# Patient Record
Sex: MALE | Race: WHITE | NOT HISPANIC OR LATINO | Employment: FULL TIME | URBAN - METROPOLITAN AREA
[De-identification: names, ages, dates, MRNs, and addresses within clinical notes are randomized per-mention and may not be internally consistent; named-entity substitution may affect disease eponyms.]

---

## 2023-05-24 ENCOUNTER — LAB (OUTPATIENT)
Dept: LAB | Facility: LAB | Age: 28
End: 2023-05-24
Payer: COMMERCIAL

## 2023-05-24 ENCOUNTER — OFFICE VISIT (OUTPATIENT)
Dept: PRIMARY CARE | Facility: CLINIC | Age: 28
End: 2023-05-24
Payer: COMMERCIAL

## 2023-05-24 VITALS
SYSTOLIC BLOOD PRESSURE: 120 MMHG | HEIGHT: 71 IN | WEIGHT: 163.4 LBS | DIASTOLIC BLOOD PRESSURE: 78 MMHG | BODY MASS INDEX: 22.88 KG/M2

## 2023-05-24 DIAGNOSIS — Z00.00 ROUTINE GENERAL MEDICAL EXAMINATION AT A HEALTH CARE FACILITY: ICD-10-CM

## 2023-05-24 DIAGNOSIS — Z00.00 ROUTINE GENERAL MEDICAL EXAMINATION AT A HEALTH CARE FACILITY: Primary | ICD-10-CM

## 2023-05-24 LAB
ALANINE AMINOTRANSFERASE (SGPT) (U/L) IN SER/PLAS: 23 U/L (ref 10–52)
ALBUMIN (G/DL) IN SER/PLAS: 4.7 G/DL (ref 3.4–5)
ALKALINE PHOSPHATASE (U/L) IN SER/PLAS: 44 U/L (ref 33–120)
ANION GAP IN SER/PLAS: 8 MMOL/L (ref 10–20)
ASPARTATE AMINOTRANSFERASE (SGOT) (U/L) IN SER/PLAS: 25 U/L (ref 9–39)
BILIRUBIN TOTAL (MG/DL) IN SER/PLAS: 1 MG/DL (ref 0–1.2)
CALCIUM (MG/DL) IN SER/PLAS: 10.1 MG/DL (ref 8.6–10.3)
CARBON DIOXIDE, TOTAL (MMOL/L) IN SER/PLAS: 31 MMOL/L (ref 21–32)
CHLORIDE (MMOL/L) IN SER/PLAS: 102 MMOL/L (ref 98–107)
CHOLESTEROL (MG/DL) IN SER/PLAS: 181 MG/DL (ref 0–199)
CHOLESTEROL IN HDL (MG/DL) IN SER/PLAS: 54 MG/DL
CHOLESTEROL/HDL RATIO: 3.4
CREATININE (MG/DL) IN SER/PLAS: 1.07 MG/DL (ref 0.5–1.3)
ERYTHROCYTE DISTRIBUTION WIDTH (RATIO) BY AUTOMATED COUNT: 12.5 % (ref 11.5–14.5)
ERYTHROCYTE MEAN CORPUSCULAR HEMOGLOBIN CONCENTRATION (G/DL) BY AUTOMATED: 32.9 G/DL (ref 32–36)
ERYTHROCYTE MEAN CORPUSCULAR VOLUME (FL) BY AUTOMATED COUNT: 88 FL (ref 80–100)
ERYTHROCYTES (10*6/UL) IN BLOOD BY AUTOMATED COUNT: 5.03 X10E12/L (ref 4.5–5.9)
ESTIMATED AVERAGE GLUCOSE FOR HBA1C: 103 MG/DL
GFR MALE: >90 ML/MIN/1.73M2
GLUCOSE (MG/DL) IN SER/PLAS: 78 MG/DL (ref 74–99)
HEMATOCRIT (%) IN BLOOD BY AUTOMATED COUNT: 44.1 % (ref 41–52)
HEMOGLOBIN (G/DL) IN BLOOD: 14.5 G/DL (ref 13.5–17.5)
HEMOGLOBIN A1C/HEMOGLOBIN TOTAL IN BLOOD: 5.2 %
LDL: 115 MG/DL (ref 0–99)
LEUKOCYTES (10*3/UL) IN BLOOD BY AUTOMATED COUNT: 5.8 X10E9/L (ref 4.4–11.3)
PLATELETS (10*3/UL) IN BLOOD AUTOMATED COUNT: 237 X10E9/L (ref 150–450)
POTASSIUM (MMOL/L) IN SER/PLAS: 4.4 MMOL/L (ref 3.5–5.3)
PROTEIN TOTAL: 7.1 G/DL (ref 6.4–8.2)
SODIUM (MMOL/L) IN SER/PLAS: 137 MMOL/L (ref 136–145)
TRIGLYCERIDE (MG/DL) IN SER/PLAS: 58 MG/DL (ref 0–149)
UREA NITROGEN (MG/DL) IN SER/PLAS: 21 MG/DL (ref 6–23)
VLDL: 12 MG/DL (ref 0–40)

## 2023-05-24 PROCEDURE — 85027 COMPLETE CBC AUTOMATED: CPT

## 2023-05-24 PROCEDURE — 83036 HEMOGLOBIN GLYCOSYLATED A1C: CPT

## 2023-05-24 PROCEDURE — 80053 COMPREHEN METABOLIC PANEL: CPT

## 2023-05-24 PROCEDURE — 36415 COLL VENOUS BLD VENIPUNCTURE: CPT

## 2023-05-24 PROCEDURE — 1036F TOBACCO NON-USER: CPT | Performed by: INTERNAL MEDICINE

## 2023-05-24 PROCEDURE — 99395 PREV VISIT EST AGE 18-39: CPT | Performed by: INTERNAL MEDICINE

## 2023-05-24 PROCEDURE — 80061 LIPID PANEL: CPT

## 2023-05-24 ASSESSMENT — PATIENT HEALTH QUESTIONNAIRE - PHQ9
1. LITTLE INTEREST OR PLEASURE IN DOING THINGS: NOT AT ALL
SUM OF ALL RESPONSES TO PHQ9 QUESTIONS 1 AND 2: 0
2. FEELING DOWN, DEPRESSED OR HOPELESS: NOT AT ALL

## 2023-05-24 NOTE — PROGRESS NOTES
"SUBJECTIVE:   Tobi Malone is a 27 y.o. male presenting for his annual checkup.  No current outpatient medications on file.     No current facility-administered medications for this visit.     Allergies: Amoxicillin     ROS:  Feeling well. No dyspnea or chest pain on exertion. No abdominal pain, change in bowel habits, black or bloody stools. No urinary tract or prostatic symptoms. No neurological complaints.    OBJECTIVE:   The patient appears well, alert, oriented x 3, in no distress.   /78   Ht 1.797 m (5' 10.75\")   Wt 74.1 kg (163 lb 6.4 oz)   BMI 22.95 kg/m²   ENT normal.  Neck supple. No adenopathy or thyromegaly. JESSICA. Lungs are clear, good air entry, no wheezes, rhonchi or rales. S1 and S2 normal, no murmurs, regular rate and rhythm. Abdomen is soft without tenderness, guarding, mass or organomegaly.  exam: no penile lesions or discharge, no testicular masses or tenderness, no hernias.  Extremities show no edema, normal peripheral pulses. Neurological is normal without focal findings.    ASSESSMENT:   healthy adult male    PLAN:   continue current healthy lifestyle patterns and return for routine annual checkups   #1 HSV- reviewed at length. Discussed natural history and wrist. Discussed risk of spread. Reviewed when necessary treatment for flare ups. Answered multiple questions.  #2 Hyper bilirubinemia- recheck  #3 elevated LDL cholesterol- reviewed. Diet and exercise discussed. Retest  "

## 2023-07-06 ENCOUNTER — TELEMEDICINE (OUTPATIENT)
Dept: PRIMARY CARE | Facility: CLINIC | Age: 28
End: 2023-07-06
Payer: COMMERCIAL

## 2023-07-06 DIAGNOSIS — J01.00 ACUTE NON-RECURRENT MAXILLARY SINUSITIS: Primary | ICD-10-CM

## 2023-07-06 PROCEDURE — 99213 OFFICE O/P EST LOW 20 MIN: CPT | Performed by: FAMILY MEDICINE

## 2023-07-06 RX ORDER — FLUTICASONE PROPIONATE 50 MCG
1 SPRAY, SUSPENSION (ML) NASAL DAILY
Qty: 16 G | Refills: 0 | Status: SHIPPED | OUTPATIENT
Start: 2023-07-06 | End: 2024-07-05

## 2023-07-06 RX ORDER — DOXYCYCLINE 100 MG/1
100 CAPSULE ORAL 2 TIMES DAILY
Qty: 14 CAPSULE | Refills: 0 | Status: SHIPPED | OUTPATIENT
Start: 2023-07-06 | End: 2023-07-13

## 2023-07-06 ASSESSMENT — ENCOUNTER SYMPTOMS
HOARSE VOICE: 1
CHILLS: 0
DIAPHORESIS: 0
SINUS PRESSURE: 1
SHORTNESS OF BREATH: 0
COUGH: 0
HEADACHES: 1
NECK PAIN: 1
SWOLLEN GLANDS: 0
SORE THROAT: 1

## 2023-07-06 ASSESSMENT — LIFESTYLE VARIABLES: HISTORY_OF_SMOKING: I HAVE NEVER SMOKED

## 2023-07-06 NOTE — PROGRESS NOTES
Subjective   Patient ID: Tobi Malone is a 27 y.o. male who presents for Sinusitis.    Sinusitis  This is a new problem. The current episode started in the past 7 days. The problem has been gradually worsening since onset. There has been no fever. His pain is at a severity of 7/10. The pain is moderate. Associated symptoms include congestion, ear pain, headaches, a hoarse voice, neck pain, sinus pressure and a sore throat. Pertinent negatives include no chills, coughing, diaphoresis, shortness of breath or swollen glands. Past treatments include oral decongestants and nasal decongestants. The treatment provided mild relief.        Review of Systems:  Constitutional: No fever or chills  Cardiovascular: no chest pain, no palpitations and no syncope.   Respiratory: no cough, no shortness of breath during exertion and no shortness of breath at rest.   Gastrointestinal: no abdominal pain, no nausea and no vomiting.  Neuro: No Headache, no dizziness    Physical Exam:   Constitutional: Alert and in no acute distress. Well developed, well nourished.   Head and Face: Head and face: Normal.     Eyes: Normal external exam.    Ears, Nose, Mouth, and Throat: External inspection of ears and nose: Normal.  Hearing: Normal.   Neck: No neck mass was observed. Supple.  Pulmonary: No respiratory distress.    Musculoskeletal: Range of motion: Normal.     Skin: Normal skin color and pigmentation, normal skin turgor, and no rash.    Neurologic: Coordination: Normal.    Psychiatric: Judgment and insight: Intact. Mood and affect: Normal.    Lab Results   Component Value Date    WBC 5.8 05/24/2023    HGB 14.5 05/24/2023    HCT 44.1 05/24/2023     05/24/2023    CHOL 181 05/24/2023    TRIG 58 05/24/2023    HDL 54.0 05/24/2023    ALT 23 05/24/2023    AST 25 05/24/2023     05/24/2023    K 4.4 05/24/2023     05/24/2023    CREATININE 1.07 05/24/2023    BUN 21 05/24/2023    CO2 31 05/24/2023    TSH 2.48 05/13/2021    HGBA1C 5.2  05/24/2023       No image results found.      Assessment/Plan   Diagnoses and all orders for this visit:  Acute non-recurrent maxillary sinusitis  -     doxycycline (Vibramycin) 100 mg capsule; Take 1 capsule (100 mg) by mouth 2 times a day for 7 days. Take with at least 8 ounces (large glass) of water, do not lie down for 30 minutes after  -     fluticasone (Flonase) 50 mcg/actuation nasal spray; Administer 1 spray into each nostril once daily. Shake gently. Before first use, prime pump. After use, clean tip and replace cap.        This Medical recommendation has been made based on the Telephonic/Video conversation and the history is given by the patient, which I as a Physician and the patient also understand that there are limitations given the lack of an in-person Physical Exam. Patient will call back if symptoms don't improve.      Lyudmila Nieto MD

## 2023-09-01 DIAGNOSIS — L50.9 HIVES: ICD-10-CM

## 2023-09-01 DIAGNOSIS — T63.444A BEE STING REACTION, UNDETERMINED INTENT, INITIAL ENCOUNTER: ICD-10-CM

## 2023-09-28 DIAGNOSIS — R35.0 URINARY FREQUENCY: ICD-10-CM

## 2023-09-28 DIAGNOSIS — R32 URINARY INCONTINENCE, UNSPECIFIED TYPE: ICD-10-CM

## 2023-10-05 ENCOUNTER — TELEPHONE (OUTPATIENT)
Dept: PRIMARY CARE | Facility: CLINIC | Age: 28
End: 2023-10-05
Payer: COMMERCIAL

## 2023-11-17 ENCOUNTER — CONSULT (OUTPATIENT)
Dept: ALLERGY | Facility: CLINIC | Age: 28
End: 2023-11-17
Payer: COMMERCIAL

## 2023-11-17 VITALS
WEIGHT: 164.19 LBS | SYSTOLIC BLOOD PRESSURE: 119 MMHG | HEIGHT: 72 IN | BODY MASS INDEX: 22.24 KG/M2 | DIASTOLIC BLOOD PRESSURE: 78 MMHG | HEART RATE: 54 BPM

## 2023-11-17 DIAGNOSIS — L50.0 ALLERGIC URTICARIA: ICD-10-CM

## 2023-11-17 DIAGNOSIS — T78.2XXA ANAPHYLAXIS, INITIAL ENCOUNTER: Primary | ICD-10-CM

## 2023-11-17 DIAGNOSIS — T63.91XA TOXIC EFFECT OF VENOM, ACCIDENTAL OR UNINTENTIONAL, INITIAL ENCOUNTER: ICD-10-CM

## 2023-11-17 PROCEDURE — 99204 OFFICE O/P NEW MOD 45 MIN: CPT | Performed by: ALLERGY & IMMUNOLOGY

## 2023-11-17 PROCEDURE — 95017 ALL TSTG PERQ&IQ W/VENOMS: CPT | Performed by: ALLERGY & IMMUNOLOGY

## 2023-11-17 RX ORDER — EPINEPHRINE 0.3 MG/.3ML
0.3 INJECTION SUBCUTANEOUS ONCE AS NEEDED
COMMUNITY
Start: 2023-08-27 | End: 2024-08-27

## 2023-11-17 ASSESSMENT — ENCOUNTER SYMPTOMS: ALLERGIC REACTION: 1

## 2023-11-17 ASSESSMENT — PAIN SCALES - GENERAL: PAINLEVEL: 0-NO PAIN

## 2023-11-17 NOTE — LETTER
Ketan,     Allow me to introduce this patient, Tobi Malone.     He had dizziness consistent with low blood pressure and severe urticaria after being stung by a suspected yellowjacket this Summer. He is positive on skin testing. Since he lives closer to your office, I'm sending him to you to discuss allergy immunotherapy.     He's bringing you a copy of the skin testing and my notes from [facility] should hopefully be attached to this letter.    Hope you're well and let me know if you have any questions.    Best regards, Jhoan

## 2023-11-17 NOTE — PROGRESS NOTES
Subjective   Patient ID:   [id number]   Tobi Malone is a 28 y.o. male who presents for Allergic Reaction (NPV-BEE STING REACTION).    Chief Complaint   Patient presents with    Allergic Reaction     NPV-BEE STING REACTION          Allergic Reaction      This patient is here to evaluate for:  Venom allergic reaction.    In 2023- stung on right forearm - while golfing. Unsure what type of stinging insect.   Large local. Resolved without incident    A week later, stung in ankle while in his yard.  [place] lightheaded. And itchy within 10 minutes locally.   Went to grocery store to purchase benadryl bc pruritic.  Severe itching and hives noticed 1-1.5 hours later.    Pics c/w large hives, Large local on ankle at sting site,   And hives on chest, neck, waist the size of dime/quarter.   Entire back was erythematous from scapula to waist.     Went home, took shower and head to toe hives despite total dose of 100mg benadryl    Went to Urgent care - treated with prednisone  Itching fortunately resolved.   They gave him an epipen but did not administer one.     No respiratory, throat, dysphagia, problems with oral secretions, GI sxs.  Lightheadedness reported and this is consistent with low blood pressure.     Best friend's Dad swarmed by 100's of bees and  - this occurred while he was power washing. This makes him anxious understandably and has questions about what to do to prevent a severe reaction in the future.    This patient has an epinephrine autoinjector but was not taught instructions.    Also, endorses Allergic rhinitis and conjunctivitis - in spring managed by [facility] in April/May and hasn't needed that.     Review of Systems   All other systems reviewed and are negative.        Objective     /78 (BP Location: Right arm, Patient Position: Sitting, BP Cuff Size: Adult)   Pulse 54   Ht 1.829 m (6')   Wt 74.5 kg (164 lb 3 oz)   BMI 22.27 kg/m²      Physical Exam  Vitals and nursing note reviewed.    Constitutional:       Appearance: Normal appearance. He is normal weight.   HENT:      Head: Normocephalic and atraumatic.      Right Ear: External ear normal.      Left Ear: External ear normal.      Nose: No septal deviation, congestion or rhinorrhea.      Right Turbinates: Not enlarged, swollen or pale.      Left Turbinates: Not enlarged, swollen or pale.      Mouth/Throat:      Mouth: Mucous membranes are moist.   Eyes:      Extraocular Movements: Extraocular movements intact.      Conjunctiva/sclera: Conjunctivae normal.      Pupils: Pupils are equal, round, and reactive to light.   Cardiovascular:      Rate and Rhythm: Normal rate and regular rhythm.      Pulses: Normal pulses.      Heart sounds: Normal heart sounds.   Pulmonary:      Effort: Pulmonary effort is normal.      Breath sounds: Normal breath sounds. No wheezing, rhonchi or rales.   Musculoskeletal:         General: Normal range of motion.   Skin:     General: Skin is warm and dry.      Findings: No erythema or rash.   Neurological:      General: No focal deficit present.      Mental Status: He is alert and oriented to person, place, and time.   Psychiatric:         Mood and Affect: Mood normal.         Behavior: Behavior normal.            Current Outpatient Medications   Medication Sig Dispense Refill    fluticasone (Flonase) 50 mcg/actuation nasal spray Administer 1 spray into each nostril once daily. Shake gently. Before first use, prime pump. After use, clean tip and replace cap. 16 g 0     No current facility-administered medications for this visit.          Assessment/Plan   Diagnoses and all orders for this visit:  Anaphylaxis, initial encounter  Allergic urticaria  Toxic effect of venom, accidental or unintentional, initial encounter      I recommended that the patient undergo allergy immunotherapy for venoms.  Patients with a history of systemic reactions to venom have a high risk of future reactions including anaphylaxis. Your allergy  can be life threatening and triggers need to be avoided.  Allergy shot therapy is 95% effective in preventing a severe reaction.  After the initial buildup phase requiring weekly shots, monthly injections for 3-5 years are protective and would be life-saving. The benefits and risks of allergy shots were discussed with the patient including the risk of an allergic reaction to the shot that would require treatment. For this reason, a 30-minute wait period in the office is advised.      In the event of a future sting reaction, for symptoms limited to the skin, Benadryl may be used. In case of a more severe allergic reaction or anaphylaxis, I recommended that an EpiPen be kept on-person in case of a severe allergic reaction.      We also discussed signs and symptoms and treatment of anaphylaxis. Your allergy can be life threatening and triggers need to be avoided.  In case of a mild reaction limited to the skin, then an antihistamine may be used.  If there is a more severe allergic reaction such as throat tightness, wheezing, shortness of breath, vomiting, or other signs of anaphylaxis, then epinephrine (Epi-Pen 0.3mg) should be used.  We discussed how and when to use this medication.  Epinephrine expires after one year and should not be kept in a hot or cold location as this will degrade the medication.    Since my office is far from where you live, I am sending you to a colleague who can assist you with immunotherapy.    I would be happy to see you again as needed. Please feel free to contact my office to schedule a follow-up with our office at [phone].      Eamon Nunes MD

## 2024-04-08 ENCOUNTER — TELEMEDICINE (OUTPATIENT)
Dept: PRIMARY CARE | Facility: CLINIC | Age: 29
End: 2024-04-08
Payer: COMMERCIAL

## 2024-04-08 DIAGNOSIS — J01.40 ACUTE PANSINUSITIS, RECURRENCE NOT SPECIFIED: Primary | ICD-10-CM

## 2024-04-08 PROCEDURE — 1036F TOBACCO NON-USER: CPT

## 2024-04-08 PROCEDURE — 99213 OFFICE O/P EST LOW 20 MIN: CPT

## 2024-04-08 RX ORDER — DOXYCYCLINE 100 MG/1
100 CAPSULE ORAL 2 TIMES DAILY
Qty: 14 CAPSULE | Refills: 0 | Status: SHIPPED | OUTPATIENT
Start: 2024-04-08 | End: 2024-04-15

## 2024-04-08 ASSESSMENT — LIFESTYLE VARIABLES: HISTORY_OF_SMOKING: I HAVE NEVER SMOKED

## 2024-04-08 NOTE — PROGRESS NOTES
Subjective   Patient ID: Tobi Malone is a 28 y.o. male who presents for sinus infection.    HPI   With patient's permission, this is a Telemedicine visit with video and audio. Provider located at office address. Patient located at their home address. All issues as documented below were discussed and addressed but limited physical exam was performed. If it was felt that the patient should be evaluated via face-to-face office appointment(s) they were directed to appropriate location.     Tobi is seen via virtual visit for c/o sinus congestion/pressure, occasional cough for one week. Symptoms do not seem to be improving. Has been taking Sudafed, Afrin with mild relief. Tolerating fluids. Daughter was recently sick. Denies chest pain, shortness of breath, fever, chills, nausea, vomiting.     Review of Systems  All other systems have been reviewed and are negative except as noted in the HPI.     Objective   There were no vitals taken for this visit.    Physical Exam  Not performed due to limitations of virtual telemedicine encounter.     Assessment/Plan   Problem List Items Addressed This Visit    None  Visit Diagnoses         Codes    Acute pansinusitis, recurrence not specified    -  Primary  Acute.  Doxycycline as directed. Risks and benefits of medication discussed and prescribed.   OTC Tylenol/ibuprofen as directed for sinus pain, aches.  Flonase as directed for sinus congestion, ear fullness.  Discontinue Afrin use.  Increase fluids, rest.  Follow-up with PCP if symptoms do not improve within 7 to 10 days. J01.40    Relevant Medications    doxycycline (Vibramycin) 100 mg capsule

## 2024-04-10 ENCOUNTER — APPOINTMENT (OUTPATIENT)
Dept: PRIMARY CARE | Facility: CLINIC | Age: 29
End: 2024-04-10
Payer: COMMERCIAL

## 2024-05-24 ENCOUNTER — TELEPHONE (OUTPATIENT)
Dept: PRIMARY CARE | Facility: CLINIC | Age: 29
End: 2024-05-24
Payer: COMMERCIAL

## 2024-05-24 NOTE — TELEPHONE ENCOUNTER
Spoke with patient and relayed to him you were going to review his ER visit notes and any imaging they took during that visit and would send a mycTinitellt message.  He said the color is better and pain is minimal but that he's worried about a possible blood clot

## 2024-05-24 NOTE — TELEPHONE ENCOUNTER
Patient called back he is concerned they ER Dr did a 2.0 ultrasound but tech for regular ultrasound was gone by time he got to ER last night.

## 2024-05-24 NOTE — TELEPHONE ENCOUNTER
Patient was seen at [facility] for foot pain. They told him its vascular. He has an apt next Wednesday but they didn't do an ultrasound.  He is requesting a script for ultrasound is concerned about a possible blood clot.

## 2024-05-27 NOTE — TELEPHONE ENCOUNTER
I called patient 5/24/2024.  Reviewed symptoms at length.  He states he is feeling some better.  However, to be complete I recommend emergency department evaluation directly.  Reviewed indications.  Reviewed potential loss of extremity.

## 2024-12-09 ENCOUNTER — APPOINTMENT (OUTPATIENT)
Dept: PRIMARY CARE | Facility: CLINIC | Age: 29
End: 2024-12-09
Payer: COMMERCIAL

## 2024-12-09 VITALS
SYSTOLIC BLOOD PRESSURE: 126 MMHG | BODY MASS INDEX: 23.27 KG/M2 | DIASTOLIC BLOOD PRESSURE: 84 MMHG | WEIGHT: 166.2 LBS | HEIGHT: 71 IN

## 2024-12-09 DIAGNOSIS — Z00.00 WELL ADULT EXAM: Primary | ICD-10-CM

## 2024-12-09 PROCEDURE — 99395 PREV VISIT EST AGE 18-39: CPT | Performed by: INTERNAL MEDICINE

## 2024-12-09 PROCEDURE — 3008F BODY MASS INDEX DOCD: CPT | Performed by: INTERNAL MEDICINE

## 2025-05-19 DIAGNOSIS — R22.9 SUBCUTANEOUS MASS: Primary | ICD-10-CM

## 2025-05-27 ENCOUNTER — APPOINTMENT (OUTPATIENT)
Dept: SURGERY | Facility: CLINIC | Age: 30
End: 2025-05-27
Payer: COMMERCIAL

## 2025-05-27 VITALS
TEMPERATURE: 98.7 F | OXYGEN SATURATION: 98 % | RESPIRATION RATE: 17 BRPM | HEIGHT: 72 IN | WEIGHT: 166 LBS | SYSTOLIC BLOOD PRESSURE: 121 MMHG | DIASTOLIC BLOOD PRESSURE: 74 MMHG | HEART RATE: 57 BPM | BODY MASS INDEX: 22.48 KG/M2

## 2025-05-27 DIAGNOSIS — R22.9 SUBCUTANEOUS MASS: ICD-10-CM

## 2025-05-27 PROCEDURE — 3008F BODY MASS INDEX DOCD: CPT | Performed by: SURGERY

## 2025-05-27 PROCEDURE — 99203 OFFICE O/P NEW LOW 30 MIN: CPT | Performed by: SURGERY

## 2025-05-27 RX ORDER — EPINEPHRINE 0.3 MG/.3ML
INJECTION INTRAMUSCULAR
COMMUNITY

## 2025-05-27 NOTE — H&P
History Of Present Illness :  Tobi Malone is an otherwise healthy 29 y.o. male who presents on referral from Dr. Kinza Villasenor for evaluation of possible soft tissue mass of the back.  Patient was last seen by Dr. Villasenor on 12/9/2024.  Recently, the patient requested evaluation of a possible lipoma of the back as documented in the encounter section 5/19/2025.    The patient notes a 2-year history of a subcutaneous mass or lesion of the right mid medial back.  This has remained small and not significantly changed in size over the last 2 years.  However, the patient is an avid runner and did run the [employer] on May 18.  Thereafter, the patient noted that the area in question became swollen and enlarged for a number of days and then resolved.  Is now back to his normal state.  He has noted no similar lesions in the past.    Patient [place] in [place] and works in [email or web address].  He has 1 daughter.    Past Medical History   Medical History[1]     Surgical History    Surgical History[2]     Allergies   RX Allergies[3]     Home Meds  No current outpatient medications     Family History    Family History[4]     Social History  Social History[5]     Review Of Systems    Review of Systems    General: Not Present- Obesity, Cancer, HIV, MRSA, Recent Cold/Flu, Tired During the Day and VRE.  HEENT: Not Present- Migraine, Cataracts, Glaucoma, Macular Degeneration and Retinal Detachment.  Respiratory:Not Present- Asthma, Chronic Cough, Difficulty Breathing on Exertion, Difficulty Breathing at Rest, Emphysema, Frequent Bronchitis, Home CPAP/BiPAP, Home Oxygen, Pulmonary Embolus, Pneumonia/TB, Sleep Apnea and Snoring.  Cardiovascular: Not Present- Chest Pain, Congestive Heart Failure, Heart Attack, Coronary Artery Disease, Heart Stent, High Cholesterol/Lipids,Hypertension, Internal Defibrillator, Irregular Heart Beat, Mitral Valve Prolapse, Murmur, Pacemaker and Peripheral Vascular Disease.  Gastrointestinal: Not Present- Heartburn,  Hepatitis, Hiatal Hernia, Jaundice, Reflux, Stomach Ulcer and IBS.  Male Genitourinary: Not Present- Enlarged Prostate, Kidney Failure, Kidney Stones, Dialysis and Urinary Tract Infection.  Musculoskeletal: Not Present- Arthritis, Back Pain and Fibromyalgia.  Neurological: Not Present- Headaches, Numbness, Tingling, Seizures, Stroke,  Shunt and Weakness.  Psychiatric: Not Present- Anxiety, Bipolar, Depression and Panic Attacks.  Endocrine: Not Present- Diabetes, Hyperthyroidism, Hypothyroidism and Low Blood Sugar.  Hematology: Not Present- Abnormal Bleeding, Anemia and Blood Clots.    Vitals  There were no vitals taken for this visit.     Physical Exam   Skin & Soft Tissue Exam    Integumentary  Global Assessment: Examination of related systems reveals - Well-developed, well-nourished and in no acute distress; alert and oriented x 3,  Neck supple, with no palpable masses, no thyromegaly, No lymphadenopathy and Apocrine and  eccrine glands are normal with no hyperhidrosis.   Upon inspection and palpation of skin surfaces of the - Head/Face: no rashes, ulcers, lesions or evidence of photo damage. No palpable nodules or masses, Neck: no visible lesions or palpable masses, Chest: no swelling,scarring or lesions. No prominent arteries or veins observed, Axillae: non-tender, no inflammation or ulceration, no drainage., Abdomen: no scars, rashes or lesions, Back: normal skin surface, no evidence of  photo damage, no suspicious lesions, Right upper extremity: no lesions or rashes. No evidence of photo damage, Left upper extremity: no lesions or rashes. No evidence of photo damage, Right lower extremity: no stasis ulcers, rashes or suspicious lesions. No evidence of photo damage, Left lower extremity: no stasis ulcers, rashes or suspicious lesions. No evidence of  photo damage, Distribution of scalp and body hair is normal and No dandruff or other scalp lesions noted.    Back: 4 cm right lateral to T11, in the medial right  mid back, there is a 1.5 cm transverse by 1.0 cm sagittal ill-defined firm subcutaneous mass.  This is nontender.  The overlying skin is normal.    Chest and Lung Exam  Chest and lung exam reveals - normal excursion with symmetric chest walls and on auscultation, normal breath sounds, no  adventitious sounds and normal vocal resonance.    Cardiovascular  Cardiovascular examination reveals - normal heart sounds, regular rate and rhythm with no murmurs.    Assessment/Plan   Mr. Malone has a small subcutaneous mass of the medial mid right back of uncertain etiology.  Based on history and examination, this is likely benign.  This is not however have characteristics of a lipoma.    Recommendations:    Options were explained the patient and these include expectant management, imaging with sonography, or excision.  As the lesion has been stable for a number of years, and is asymptomatic, continued expectant management is reasonable, and the patient would like to pursue that.    I recommend monthly self exams or exams by his wife.  If the lesion changes in size or character, the patient return to see me for further evaluation and treatment.  Alternatively, if the patient notes recurrent enlargement with activity particularly running, the patient can call and we will order sonographic imaging to further characterize.    Otherwise, the patient follow-up me as needed.         [1] No past medical history on file.  [2]   Past Surgical History:  Procedure Laterality Date    FOOT SURGERY  10/06/2017    Foot Surgery   [3]   Allergies  Allergen Reactions    Amoxicillin Hives   [4]   Family History  Problem Relation Name Age of Onset    Depression Mother      Hyperlipidemia Mother      Hypothyroidism Mother      Hyperlipidemia Father      Diabetes Sister          type1    Skin cancer Paternal Grandfather  70 - 79   [5]   Social History  Tobacco Use    Smoking status: Never    Smokeless tobacco: Never

## 2025-06-12 ENCOUNTER — APPOINTMENT (OUTPATIENT)
Dept: PRIMARY CARE | Facility: CLINIC | Age: 30
End: 2025-06-12
Payer: COMMERCIAL

## 2025-06-12 VITALS
WEIGHT: 162.8 LBS | SYSTOLIC BLOOD PRESSURE: 106 MMHG | DIASTOLIC BLOOD PRESSURE: 70 MMHG | BODY MASS INDEX: 22.79 KG/M2 | HEIGHT: 71 IN

## 2025-06-12 DIAGNOSIS — Z13.0 SCREENING FOR BLOOD DISEASE: ICD-10-CM

## 2025-06-12 DIAGNOSIS — Z13.6 SCREENING FOR CARDIOVASCULAR CONDITION: ICD-10-CM

## 2025-06-12 DIAGNOSIS — Z00.00 WELL ADULT EXAM: Primary | ICD-10-CM

## 2025-06-12 LAB
ALBUMIN SERPL-MCNC: 4.6 G/DL (ref 3.6–5.1)
ALP SERPL-CCNC: 46 U/L (ref 36–130)
ALT SERPL-CCNC: 36 U/L (ref 9–46)
ANION GAP SERPL CALCULATED.4IONS-SCNC: 7 MMOL/L (CALC) (ref 7–17)
AST SERPL-CCNC: 30 U/L (ref 10–40)
BILIRUB SERPL-MCNC: 0.8 MG/DL (ref 0.2–1.2)
BUN SERPL-MCNC: 19 MG/DL (ref 7–25)
CALCIUM SERPL-MCNC: 9.4 MG/DL (ref 8.6–10.3)
CHLORIDE SERPL-SCNC: 103 MMOL/L (ref 98–110)
CHOLEST SERPL-MCNC: 208 MG/DL
CHOLEST/HDLC SERPL: 3.7 (CALC)
CO2 SERPL-SCNC: 29 MMOL/L (ref 20–32)
CREAT SERPL-MCNC: 0.89 MG/DL (ref 0.6–1.26)
EGFRCR SERPLBLD CKD-EPI 2021: 117 ML/MIN/1.73M2
ERYTHROCYTE [DISTWIDTH] IN BLOOD BY AUTOMATED COUNT: 12.8 % (ref 11–15)
GLUCOSE SERPL-MCNC: 78 MG/DL (ref 65–99)
HCT VFR BLD AUTO: 46.3 % (ref 38.5–50)
HDLC SERPL-MCNC: 56 MG/DL
HGB BLD-MCNC: 15.2 G/DL (ref 13.2–17.1)
LDLC SERPL CALC-MCNC: 137 MG/DL (CALC)
MCH RBC QN AUTO: 29.4 PG (ref 27–33)
MCHC RBC AUTO-ENTMCNC: 32.8 G/DL (ref 32–36)
MCV RBC AUTO: 89.6 FL (ref 80–100)
NONHDLC SERPL-MCNC: 152 MG/DL (CALC)
PLATELET # BLD AUTO: 230 THOUSAND/UL (ref 140–400)
PMV BLD REES-ECKER: 9.8 FL (ref 7.5–12.5)
POTASSIUM SERPL-SCNC: 4.5 MMOL/L (ref 3.5–5.3)
PROT SERPL-MCNC: 7.1 G/DL (ref 6.1–8.1)
RBC # BLD AUTO: 5.17 MILLION/UL (ref 4.2–5.8)
SODIUM SERPL-SCNC: 139 MMOL/L (ref 135–146)
TRIGL SERPL-MCNC: 62 MG/DL
WBC # BLD AUTO: 6.6 THOUSAND/UL (ref 3.8–10.8)

## 2025-06-12 PROCEDURE — 99395 PREV VISIT EST AGE 18-39: CPT | Performed by: INTERNAL MEDICINE

## 2025-06-12 PROCEDURE — 1036F TOBACCO NON-USER: CPT | Performed by: INTERNAL MEDICINE

## 2025-06-12 PROCEDURE — 3008F BODY MASS INDEX DOCD: CPT | Performed by: INTERNAL MEDICINE

## 2025-06-12 ASSESSMENT — PATIENT HEALTH QUESTIONNAIRE - PHQ9
2. FEELING DOWN, DEPRESSED OR HOPELESS: NOT AT ALL
SUM OF ALL RESPONSES TO PHQ9 QUESTIONS 1 AND 2: 0
1. LITTLE INTEREST OR PLEASURE IN DOING THINGS: NOT AT ALL

## 2025-06-12 NOTE — PROGRESS NOTES
"SUBJECTIVE:   Tobi Malone is a 29 y.o. male presenting for his annual checkup.  Pharm sales  +exercise   Current Outpatient Medications   Medication Sig Dispense Refill    EPINEPHrine (EpiPen 2-Gilbert) 0.3 mg/0.3 mL injection syringe as directed Injection prn for 30 day(s) (Patient taking differently: 1 time if needed.)       No current facility-administered medications for this visit.     Allergies: Amoxicillin     ROS:  Feeling well. No dyspnea or chest pain on exertion. No abdominal pain, change in bowel habits, black or bloody stools. No urinary tract or prostatic symptoms. No neurological complaints.    OBJECTIVE:   The patient appears well, alert, oriented x 3, in no distress.   /70 (BP Location: Left arm, Patient Position: Sitting)   Ht 1.803 m (5' 11\")   Wt 73.8 kg (162 lb 12.8 oz)   BMI 22.71 kg/m²   ENT normal.  Neck supple. No adenopathy or thyromegaly. JESSICA. Lungs are clear, good air entry, no wheezes, rhonchi or rales. S1 and S2 normal, no murmurs, regular rate and rhythm. Abdomen is soft without tenderness, guarding, mass or organomegaly.  exam: no penile lesions or discharge, no testicular masses or tenderness, no hernias.  Extremities show no edema, normal peripheral pulses. Neurological is normal without focal findings.    ASSESSMENT:   healthy adult male    PLAN:   continue current healthy lifestyle patterns and return for routine annual checkups   #1 HSV-  stable  #2 Hyper bilirubinemia- suspect [place]  #3 elevated LDL cholesterol- low ascvd score.  Reviewed diet/exercise.  Retest.   "